# Patient Record
Sex: MALE | Race: WHITE | NOT HISPANIC OR LATINO | Employment: FULL TIME | URBAN - METROPOLITAN AREA
[De-identification: names, ages, dates, MRNs, and addresses within clinical notes are randomized per-mention and may not be internally consistent; named-entity substitution may affect disease eponyms.]

---

## 2017-05-13 ENCOUNTER — HOSPITAL ENCOUNTER (EMERGENCY)
Facility: HOSPITAL | Age: 42
Discharge: HOME | End: 2017-05-13
Attending: EMERGENCY MEDICINE | Admitting: EMERGENCY MEDICINE
Payer: COMMERCIAL

## 2017-05-13 ENCOUNTER — APPOINTMENT (EMERGENCY)
Dept: RADIOLOGY | Facility: HOSPITAL | Age: 42
End: 2017-05-13
Payer: COMMERCIAL

## 2017-05-13 ENCOUNTER — APPOINTMENT (EMERGENCY)
Dept: CT IMAGING | Facility: HOSPITAL | Age: 42
End: 2017-05-13
Payer: COMMERCIAL

## 2017-05-13 VITALS
DIASTOLIC BLOOD PRESSURE: 75 MMHG | RESPIRATION RATE: 16 BRPM | HEART RATE: 88 BPM | BODY MASS INDEX: 41.75 KG/M2 | SYSTOLIC BLOOD PRESSURE: 132 MMHG | WEIGHT: 315 LBS | OXYGEN SATURATION: 97 % | TEMPERATURE: 97.3 F | HEIGHT: 73 IN
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
## 2017-05-13 DIAGNOSIS — S16.1XXA CERVICAL STRAIN, ACUTE: ICD-10-CM

## 2017-05-13 DIAGNOSIS — S29.011A CHEST WALL MUSCLE STRAIN, INITIAL ENCOUNTER: ICD-10-CM

## 2017-05-13 PROCEDURE — 99284 EMERGENCY DEPT VISIT MOD MDM: CPT

## 2017-05-13 PROCEDURE — 71020 HB CHEST X-RAY 2VW FRONTAL&LATL: CPT

## 2017-05-13 PROCEDURE — 72125 CT NECK SPINE W/O DYE: CPT

## 2017-05-13 RX ORDER — OXYCODONE HYDROCHLORIDE AND ACETAMINOPHEN 5; 325 MG/1; MG/1
1 TABLET ORAL ONCE
Status: COMPLETED | OUTPATIENT
Start: 2017-05-13 | End: 2017-05-13

## 2017-05-13 RX ORDER — METHOCARBAMOL 750 MG/1
750 TABLET, FILM COATED ORAL 3 TIMES DAILY PRN
Qty: 21 TABLET | Refills: 0 | Status: SHIPPED | OUTPATIENT
Start: 2017-05-13 | End: 2017-05-20

## 2017-05-13 RX ORDER — METHOCARBAMOL 500 MG/1
500 TABLET, FILM COATED ORAL ONCE
Status: COMPLETED | OUTPATIENT
Start: 2017-05-13 | End: 2017-05-13

## 2017-05-13 RX ADMIN — METHOCARBAMOL 500 MG: 500 TABLET ORAL at 23:18

## 2017-05-13 RX ADMIN — OXYCODONE HYDROCHLORIDE AND ACETAMINOPHEN 1 TABLET: 5; 325 TABLET ORAL at 23:18

## 2023-06-25 ENCOUNTER — HOSPITAL ENCOUNTER (EMERGENCY)
Facility: HOSPITAL | Age: 48
Discharge: HOME | End: 2023-06-26
Attending: EMERGENCY MEDICINE
Payer: MEDICARE

## 2023-06-25 ENCOUNTER — APPOINTMENT (EMERGENCY)
Dept: RADIOLOGY | Facility: HOSPITAL | Age: 48
End: 2023-06-25
Payer: MEDICARE

## 2023-06-25 DIAGNOSIS — R42 LIGHTHEADEDNESS: ICD-10-CM

## 2023-06-25 DIAGNOSIS — R07.9 CHEST PAIN: Primary | ICD-10-CM

## 2023-06-25 LAB
ANION GAP SERPL CALCULATED.3IONS-SCNC: -1 MMOL/L
BASOPHILS # BLD AUTO: 0.1 THOUSANDS/ÂΜL (ref 0–0.1)
BASOPHILS NFR BLD AUTO: 1 % (ref 0–1)
BUN SERPL-MCNC: 15 MG/DL (ref 5–25)
CALCIUM SERPL-MCNC: 8.6 MG/DL (ref 8.3–10.1)
CARDIAC TROPONIN I PNL SERPL HS: 18 NG/L
CHLORIDE SERPL-SCNC: 108 MMOL/L (ref 96–108)
CO2 SERPL-SCNC: 29 MMOL/L (ref 21–32)
CREAT SERPL-MCNC: 1.04 MG/DL (ref 0.6–1.3)
EOSINOPHIL # BLD AUTO: 0.24 THOUSAND/ÂΜL (ref 0–0.61)
EOSINOPHIL NFR BLD AUTO: 2 % (ref 0–6)
ERYTHROCYTE [DISTWIDTH] IN BLOOD BY AUTOMATED COUNT: 13.2 % (ref 11.6–15.1)
GFR SERPL CREATININE-BSD FRML MDRD: 85 ML/MIN/1.73SQ M
GLUCOSE SERPL-MCNC: 102 MG/DL (ref 65–140)
HCT VFR BLD AUTO: 43.2 % (ref 36.5–49.3)
HGB BLD-MCNC: 14.1 G/DL (ref 12–17)
IMM GRANULOCYTES # BLD AUTO: 0.05 THOUSAND/UL (ref 0–0.2)
IMM GRANULOCYTES NFR BLD AUTO: 1 % (ref 0–2)
LYMPHOCYTES # BLD AUTO: 2.31 THOUSANDS/ÂΜL (ref 0.6–4.47)
LYMPHOCYTES NFR BLD AUTO: 21 % (ref 14–44)
MCH RBC QN AUTO: 28.3 PG (ref 26.8–34.3)
MCHC RBC AUTO-ENTMCNC: 32.6 G/DL (ref 31.4–37.4)
MCV RBC AUTO: 87 FL (ref 82–98)
MONOCYTES # BLD AUTO: 0.92 THOUSAND/ÂΜL (ref 0.17–1.22)
MONOCYTES NFR BLD AUTO: 8 % (ref 4–12)
NEUTROPHILS # BLD AUTO: 7.32 THOUSANDS/ÂΜL (ref 1.85–7.62)
NEUTS SEG NFR BLD AUTO: 67 % (ref 43–75)
NRBC BLD AUTO-RTO: 0 /100 WBCS
PLATELET # BLD AUTO: 215 THOUSANDS/UL (ref 149–390)
PMV BLD AUTO: 11.1 FL (ref 8.9–12.7)
POTASSIUM SERPL-SCNC: 3.7 MMOL/L (ref 3.5–5.3)
RBC # BLD AUTO: 4.98 MILLION/UL (ref 3.88–5.62)
SODIUM SERPL-SCNC: 136 MMOL/L (ref 135–147)
WBC # BLD AUTO: 10.94 THOUSAND/UL (ref 4.31–10.16)

## 2023-06-25 PROCEDURE — 85025 COMPLETE CBC W/AUTO DIFF WBC: CPT

## 2023-06-25 PROCEDURE — 36415 COLL VENOUS BLD VENIPUNCTURE: CPT

## 2023-06-25 PROCEDURE — 93005 ELECTROCARDIOGRAM TRACING: CPT

## 2023-06-25 PROCEDURE — 99285 EMERGENCY DEPT VISIT HI MDM: CPT

## 2023-06-25 PROCEDURE — 80048 BASIC METABOLIC PNL TOTAL CA: CPT

## 2023-06-25 PROCEDURE — 84484 ASSAY OF TROPONIN QUANT: CPT

## 2023-06-25 PROCEDURE — 71046 X-RAY EXAM CHEST 2 VIEWS: CPT

## 2023-06-25 RX ORDER — SODIUM CHLORIDE 9 MG/ML
3 INJECTION INTRAVENOUS
Status: DISCONTINUED | OUTPATIENT
Start: 2023-06-25 | End: 2023-06-26 | Stop reason: HOSPADM

## 2023-06-25 NOTE — Clinical Note
Sharee Chauhan was seen and treated in our emergency department on 6/25/2023  No restrictions            Diagnosis:     Delphine Kurtz  may return to work on return date  He may return on this date: 06/27/2023         If you have any questions or concerns, please don't hesitate to call        King Abhilash MD    ______________________________           _______________          _______________  [facility] Representative                              Date                                Time

## 2023-06-26 VITALS
HEART RATE: 68 BPM | SYSTOLIC BLOOD PRESSURE: 163 MMHG | TEMPERATURE: 98.1 F | OXYGEN SATURATION: 96 % | DIASTOLIC BLOOD PRESSURE: 91 MMHG | RESPIRATION RATE: 18 BRPM

## 2023-06-26 LAB
2HR DELTA HS TROPONIN: 3 NG/L
ATRIAL RATE: 70 BPM
ATRIAL RATE: 72 BPM
CARDIAC TROPONIN I PNL SERPL HS: 21 NG/L
P AXIS: 33 DEGREES
P AXIS: 44 DEGREES
PR INTERVAL: 178 MS
PR INTERVAL: 184 MS
QRS AXIS: 39 DEGREES
QRS AXIS: 51 DEGREES
QRSD INTERVAL: 102 MS
QRSD INTERVAL: 102 MS
QT INTERVAL: 388 MS
QT INTERVAL: 416 MS
QTC INTERVAL: 424 MS
QTC INTERVAL: 449 MS
T WAVE AXIS: 4 DEGREES
T WAVE AXIS: 7 DEGREES
VENTRICULAR RATE: 70 BPM
VENTRICULAR RATE: 72 BPM

## 2023-06-26 PROCEDURE — 93010 ELECTROCARDIOGRAM REPORT: CPT | Performed by: INTERNAL MEDICINE

## 2023-06-26 PROCEDURE — 36415 COLL VENOUS BLD VENIPUNCTURE: CPT

## 2023-06-26 PROCEDURE — 84484 ASSAY OF TROPONIN QUANT: CPT

## 2023-06-26 PROCEDURE — 96360 HYDRATION IV INFUSION INIT: CPT

## 2023-06-26 PROCEDURE — 93005 ELECTROCARDIOGRAM TRACING: CPT

## 2023-06-26 RX ORDER — KETOROLAC TROMETHAMINE 30 MG/ML
15 INJECTION, SOLUTION INTRAMUSCULAR; INTRAVENOUS ONCE
Status: DISCONTINUED | OUTPATIENT
Start: 2023-06-26 | End: 2023-06-26 | Stop reason: HOSPADM

## 2023-06-26 RX ADMIN — SODIUM CHLORIDE 1000 ML: 0.9 INJECTION, SOLUTION INTRAVENOUS at 01:00

## 2023-06-26 NOTE — ED PROVIDER NOTES
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 Taking? methocarbamol (ROBAXIN) 750 mg tablet   No No   Sig: Take 1 tablet by mouth 3 (three) times a day as needed for muscle spasms for up to 7 days      Facility-Administered Medications: None       History reviewed  No pertinent past medical history  Past Surgical History:   Procedure Laterality Date   • CHOLECYSTECTOMY     • ROTATOR CUFF REPAIR         History reviewed  No pertinent family history  I have reviewed and agree with the history as documented  E-Cigarette/Vaping     E-Cigarette/Vaping Substances     Social History     Tobacco Use   • Smoking status: Former   • Smokeless tobacco: Current   Substance Use Topics   • Alcohol use: No   • Drug use: No        Review of Systems   Constitutional: Negative for chills and fever  HENT: Negative for ear pain and sore throat  Eyes: Negative for pain and visual disturbance     Respiratory: Negative for cough and shortness of breath  Cardiovascular: Positive for chest pain  Negative for palpitations  Gastrointestinal: Negative for abdominal pain, constipation, diarrhea and vomiting  Genitourinary: Negative for dysuria and hematuria  Musculoskeletal: Negative for arthralgias and back pain  Skin: Negative for color change and rash  Neurological: Positive for light-headedness  Negative for seizures and syncope  Psychiatric/Behavioral: Negative for agitation and confusion  Physical Exam  ED Triage Vitals [06/25/23 2300]   Temperature Pulse Respirations Blood Pressure SpO2   98 1 °F (36 7 °C) 75 20 148/72 95 %      Temp Source Heart Rate Source Patient Position - Orthostatic VS BP Location FiO2 (%)   Oral Monitor Lying Right arm --      Pain Score       --             Orthostatic Vital Signs  Vitals:    06/25/23 2300 06/26/23 0030   BP: 148/72 163/91   Pulse: 75 68   Patient Position - Orthostatic VS: Lying Sitting       Physical Exam  Vitals and nursing note reviewed  Constitutional:       General: He is not in acute distress  Appearance: Normal appearance  He is well-developed  He is obese  HENT:      Head: Normocephalic and atraumatic  Right Ear: External ear normal       Left Ear: External ear normal    Cardiovascular:      Rate and Rhythm: Normal rate and regular rhythm  Pulmonary:      Effort: Pulmonary effort is normal  No respiratory distress  Breath sounds: Normal breath sounds  Chest:      Chest wall: No tenderness  Abdominal:      Palpations: Abdomen is soft  Tenderness: There is no abdominal tenderness  There is no guarding or rebound  Musculoskeletal:         General: Normal range of motion  Cervical back: Normal range of motion and neck supple  Skin:     General: Skin is warm and dry  Neurological:      Mental Status: He is alert and oriented to person, place, and time  Mental status is at baseline     Psychiatric:         Mood and Affect: Mood normal          Behavior: Behavior normal          ED Medications  Medications   sodium chloride (PF) 0 9 % injection 3 mL (has no administration in time range)   sodium chloride 0 9 % bolus 1,000 mL (1,000 mL Intravenous New Bag 6/26/23 0100)   ketorolac (TORADOL) injection 15 mg (has no administration in time range)       Diagnostic Studies  Results Reviewed     Procedure Component Value Units Date/Time    HS Troponin I 2hr [[id number]] Collected: 06/26/23 0059    Lab Status: No result Specimen: Blood from Arm, Left     HS Troponin I 4hr [[id number]]     Lab Status: No result Specimen: Blood     HS Troponin 0hr (reflex protocol) [[id number]]  (Normal) Collected: 06/25/23 2307    Lab Status: Final result Specimen: Blood from Arm, Left Updated: 06/25/23 2352     hs TnI 0hr 18 ng/L     Basic metabolic panel [[id number]] Collected: 06/25/23 2307    Lab Status: Final result Specimen: Blood from Arm, Left Updated: 06/25/23 2339     Sodium 136 mmol/L      Potassium 3 7 mmol/L      Chloride 108 mmol/L      CO2 29 mmol/L      ANION GAP -1 mmol/L      BUN 15 mg/dL      Creatinine 1 04 mg/dL      Glucose 102 mg/dL      Calcium 8 6 mg/dL      eGFR 85 ml/min/1 73sq m     Narrative:      [place] guidelines for Chronic Kidney Disease (CKD):   •  Stage 1 with normal or high GFR (GFR > 90 mL/min/1 73 square meters)  •  Stage 2 Mild CKD (GFR = 60-89 mL/min/1 73 square meters)  •  Stage 3A Moderate CKD (GFR = 45-59 mL/min/1 73 square meters)  •  Stage 3B Moderate CKD (GFR = 30-44 mL/min/1 73 square meters)  •  Stage 4 Severe CKD (GFR = 15-29 mL/min/1 73 square meters)  •  Stage 5 End Stage CKD (GFR <15 mL/min/1 73 square meters)  Note: GFR calculation is accurate only with a steady state creatinine    CBC and differential [[id number]]  (Abnormal) Collected: 06/25/23 2307    Lab Status: Final result Specimen: Blood from Arm, Left Updated: 06/25/23 2318     WBC 10 94 Thousand/uL      RBC 4 98 Million/uL      Hemoglobin 14 1 g/dL      Hematocrit 43 2 %      MCV 87 fL      MCH 28 3 pg      MCHC 32 6 g/dL      RDW 13 2 %      MPV 11 1 fL      Platelets 007 Thousands/uL      nRBC 0 /100 WBCs      Neutrophils Relative 67 %      Immat GRANS % 1 %      Lymphocytes Relative 21 %      Monocytes Relative 8 %      Eosinophils Relative 2 %      Basophils Relative 1 %      Neutrophils Absolute 7 32 Thousands/µL      Immature Grans Absolute 0 05 Thousand/uL      Lymphocytes Absolute 2 31 Thousands/µL      Monocytes Absolute 0 92 Thousand/µL      Eosinophils Absolute 0 24 Thousand/µL      Basophils Absolute 0 10 Thousands/µL                  X-ray chest 2 views   ED Interpretation by Merlin Nap, MD (06/26 0009)   Chest radiograph personally interpreted by me as no acute cardiopulmonary disease  Procedures  Procedures      ED Course  ED Course as of 06/26/23 0104   [place] Jun 25, 2023   2351 This ECG was interpreted by me  The ECG demonstrates Normal sinus rhythm, normal intervals, normal axis, normal QRS, non-specific ST changes  Mon Jun 26, 2023   0101 HEART score:    History 1=Moderate suspicious  ECG 1=Nonspecific repolarization disturbance  Age 1= > 45 - <65 years  Risk Factors 1= 1 or 2 risk factors  Troponin 1= Between 13-35 ng/L  Total 5         0104 Repeat ECG similar to ECG from 2 hours prior  SBIRT 20yo+    Flowsheet Row Most Recent Value   Initial Alcohol Screen: US AUDIT-C     1  How often do you have a drink containing alcohol? 0 Filed at: 06/25/2023 2301   2  How many drinks containing alcohol do you have on a typical day you are drinking? 0 Filed at: 06/25/2023 2301   3a  Male UNDER 65: How often do you have five or more drinks on one occasion? 0 Filed at: 06/25/2023 2301   Audit-C Score 0 Filed at: 06/25/2023 2301   JOANN: How many times in the past year have you    Used an illegal drug or used a prescription medication for non-medical reasons?  Never Filed at: 06/25/2023 "[facility] with lightheadedness and chest discomfort  Patient reports his symptoms have resolved after aspirin and nitro  I am concerned for ACS given his history  He does deny any history of anginal chest pain  DDx: ACS, arrhythmia, pneumothorax, dehydration    Initial troponin 18  Will await delta troponin  Patient signed out to Dr Angela Queen pending delta troponin  If unremarkable, patient to be referred to cardiology for further evaluation  Patient in agreement with plan and questions were answered  Portions or all of this note were generated using voice recognition software  Occasional wrong word or \"sound a like\" substitutions may have occurred due to the inherent limitations of voice recognition software  Please interpret any errors within the intended context of the whole sentence or idea  Amount and/or Complexity of Data Reviewed  Labs: ordered  Radiology: ordered and independent interpretation performed  Risk  Prescription drug management              Disposition  Final diagnoses:   Chest pain   Lightheadedness     Time reflects when diagnosis was documented in both MDM as applicable and the Disposition within this note     Time User Action Codes Description Comment    6/26/2023 12:35 AM Yary Barboza Add [R07 9] Chest pain     6/26/2023 12:35 AM Yary Barboza Add [R42] Lightheadedness       ED Disposition     None      Follow-up Information     Follow up With Specialties Details Why Contact Info Additional Information    [facility] Cardiology [facility] Cardiology Schedule an appointment as soon as possible for a visit in 1 week  [address]  [address] Cardiology [place] [address], [place], [place], [address]          Patient's Medications   Discharge Prescriptions    No medications on file         PDMP Review     None           ED " Provider  Attending physically available and evaluated Leala Ray  I managed the patient along with the ED Attending      Electronically Signed by         Geraldine Ortega MD  06/26/23 7189       Geraldine Ortega MD  06/26/23 5905

## 2023-06-26 NOTE — ED ATTENDING ATTESTATION
6/25/2023  Janneth HANNA DO, saw and evaluated the patient  I have discussed the patient with the resident/non-physician practitioner and agree with the resident's/non-physician practitioner's findings, Plan of Care, and MDM as documented in the resident's/non-physician practitioner's note, except where noted  All available labs and Radiology studies were reviewed  I was present for key portions of any procedure(s) performed by the resident/non-physician practitioner and I was immediately available to provide assistance  At this point I agree with the current assessment done in the Emergency Department  I have conducted an independent evaluation of this patient a history and physical is as follows:     55 yo male presents for evaluation of lightheadedness, 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

## 2023-06-26 NOTE — DISCHARGE INSTRUCTIONS
You were seen for lightheadedness and chest discomfort  We found no emergent causes for your symptoms  You should follow up for a cardiologist for further monitoring of your symptoms

## 2023-06-26 NOTE — ED CARE HANDOFF
Emergency Department Sign Out Note        Sign out and transfer of care from Dr Bruce Banda  See Separate Emergency Department note  The patient, Trell Ramirez, was evaluated by the previous provider for chest pain  Workup Completed:  Labs ekg cxr    ED Course / Workup Pending (followup):  Delta trop and repeat ekg      HEART Risk Score    Flowsheet Row Most Recent Value   Heart Score Risk Calculator    History 1 Filed at: 06/26/2023 0035   ECG 1 Filed at: 06/26/2023 0035   Age 1 Filed at: 06/26/2023 0035   Risk Factors 1 Filed at: 06/26/2023 0035   Troponin 1 Filed at: 06/26/2023 0035   HEART Score 5 Filed at: 06/26/2023 0035                                  ED Course as of 06/26/23 0155   Mon Jun 26, 2023   0042 SO: active - lightheaded L shoulder pain resolved got asa ntg  Initial trop - delta and repeat EKG - dispo pending   0154 Delta 2hr hsTnI: 3     Procedures  Medical Decision Making  Repeat EKG no changes  Delta Trop pending    Delta 3  Patient stable and cleared for discharge outpatient follow-up with cardiology  Stress test ordered  Return precautions given    Amount and/or Complexity of Data Reviewed  Labs: ordered  Decision-making details documented in ED Course  Radiology: ordered and independent interpretation performed  Risk  Prescription drug management  Disposition  Final diagnoses:   Chest pain   Lightheadedness     Time reflects when diagnosis was documented in both MDM as applicable and the Disposition within this note     Time User Action Codes Description Comment    6/26/2023 12:35 AM Rexene Fake Add [R07 9] Chest pain     6/26/2023 12:35 AM Rexene Fake Add [R42] [facility]       ED Disposition     ED Disposition   Discharge    Condition   Stable    Date/Time   Mon Jun 26, 2023  1:54 AM    Comment   [place] discharge to home/self care                 Follow-up Information     Follow up With Specialties Details Why Contact Info Additional 23 [facility] Cardiology Schedule an appointment as soon as possible for a visit in 1 week  [address]  [address] Cardiology [address], [facility] [address], [address] [place] [address] Av        Patient's Medications   Discharge Prescriptions    No medications on file            ED Provider  Electronically Signed by     Daniela López DO  06/26/23 0154